# Patient Record
Sex: MALE | Employment: UNEMPLOYED | URBAN - NONMETROPOLITAN AREA
[De-identification: names, ages, dates, MRNs, and addresses within clinical notes are randomized per-mention and may not be internally consistent; named-entity substitution may affect disease eponyms.]

---

## 2018-09-07 PROBLEM — Z82.49 FH: CARDIOVASCULAR DISEASE: Status: ACTIVE | Noted: 2018-09-07

## 2018-09-07 PROBLEM — Z72.0 TOBACCO USE: Status: ACTIVE | Noted: 2018-09-07

## 2018-09-07 PROBLEM — H53.9 VISION CHANGES: Status: ACTIVE | Noted: 2018-09-07

## 2018-09-07 PROBLEM — D49.89: Status: ACTIVE | Noted: 2018-09-07

## 2018-09-07 PROBLEM — Z13.228 SCREENING FOR METABOLIC DISORDER: Status: ACTIVE | Noted: 2018-09-07

## 2018-09-07 PROBLEM — Z13.29 SCREENING FOR THYROID DISORDER: Status: ACTIVE | Noted: 2018-09-07

## 2018-09-07 PROBLEM — Z13.6 SCREENING FOR CARDIOVASCULAR CONDITION: Status: ACTIVE | Noted: 2018-09-07

## 2018-09-07 PROBLEM — R51.9 NONINTRACTABLE HEADACHE: Status: ACTIVE | Noted: 2018-09-07

## 2018-09-07 PROBLEM — J34.89 SINUS PRESSURE: Status: ACTIVE | Noted: 2018-09-07

## 2018-09-07 PROBLEM — Z13.0 ENCOUNTER FOR SCREENING FOR DISEASES OF THE BLOOD AND BLOOD-FORMING ORGANS AND CERTAIN DISORDERS INVOLVING THE IMMUNE MECHANISM: Status: ACTIVE | Noted: 2018-09-07

## 2022-06-02 ENCOUNTER — OFFICE VISIT (OUTPATIENT)
Dept: MEDICAL GROUP | Facility: CLINIC | Age: 37
End: 2022-06-02
Payer: COMMERCIAL

## 2022-06-02 VITALS
DIASTOLIC BLOOD PRESSURE: 72 MMHG | HEIGHT: 70 IN | SYSTOLIC BLOOD PRESSURE: 100 MMHG | RESPIRATION RATE: 18 BRPM | OXYGEN SATURATION: 94 % | HEART RATE: 81 BPM | BODY MASS INDEX: 23.59 KG/M2 | WEIGHT: 164.8 LBS | TEMPERATURE: 98.1 F

## 2022-06-02 DIAGNOSIS — Z00.00 ROUTINE PHYSICAL EXAMINATION: ICD-10-CM

## 2022-06-02 DIAGNOSIS — G89.29 CHRONIC RIGHT SHOULDER PAIN: ICD-10-CM

## 2022-06-02 DIAGNOSIS — R22.32 WRIST LUMP, LEFT: ICD-10-CM

## 2022-06-02 DIAGNOSIS — M25.511 CHRONIC RIGHT SHOULDER PAIN: ICD-10-CM

## 2022-06-02 PROBLEM — Z13.0 ENCOUNTER FOR SCREENING FOR DISEASES OF THE BLOOD AND BLOOD-FORMING ORGANS AND CERTAIN DISORDERS INVOLVING THE IMMUNE MECHANISM: Status: RESOLVED | Noted: 2018-09-07 | Resolved: 2022-06-02

## 2022-06-02 PROBLEM — J34.89 SINUS PRESSURE: Status: RESOLVED | Noted: 2018-09-07 | Resolved: 2022-06-02

## 2022-06-02 PROBLEM — H53.9 VISION CHANGES: Status: RESOLVED | Noted: 2018-09-07 | Resolved: 2022-06-02

## 2022-06-02 PROCEDURE — 99203 OFFICE O/P NEW LOW 30 MIN: CPT | Performed by: PHYSICIAN ASSISTANT

## 2022-06-02 RX ORDER — NAPROXEN 500 MG/1
TABLET ORAL
COMMUNITY
Start: 2022-03-24 | End: 2022-06-07

## 2022-06-02 RX ORDER — METHOCARBAMOL 750 MG/1
TABLET, FILM COATED ORAL
COMMUNITY
Start: 2022-03-24 | End: 2022-06-02

## 2022-06-02 ASSESSMENT — PATIENT HEALTH QUESTIONNAIRE - PHQ9
CLINICAL INTERPRETATION OF PHQ2 SCORE: 2
5. POOR APPETITE OR OVEREATING: 0 - NOT AT ALL
SUM OF ALL RESPONSES TO PHQ QUESTIONS 1-9: 9

## 2022-06-07 ENCOUNTER — OFFICE VISIT (OUTPATIENT)
Dept: MEDICAL GROUP | Facility: CLINIC | Age: 37
End: 2022-06-07
Payer: COMMERCIAL

## 2022-06-07 VITALS
BODY MASS INDEX: 23.22 KG/M2 | RESPIRATION RATE: 20 BRPM | WEIGHT: 171.4 LBS | DIASTOLIC BLOOD PRESSURE: 72 MMHG | OXYGEN SATURATION: 96 % | HEART RATE: 69 BPM | SYSTOLIC BLOOD PRESSURE: 114 MMHG | HEIGHT: 72 IN | TEMPERATURE: 98.9 F

## 2022-06-07 DIAGNOSIS — R22.32 MASS OF WRIST, LEFT: ICD-10-CM

## 2022-06-07 DIAGNOSIS — M25.511 CHRONIC RIGHT SHOULDER PAIN: ICD-10-CM

## 2022-06-07 DIAGNOSIS — G89.29 CHRONIC RIGHT SHOULDER PAIN: ICD-10-CM

## 2022-06-07 PROCEDURE — 99214 OFFICE O/P EST MOD 30 MIN: CPT | Performed by: PHYSICIAN ASSISTANT

## 2022-06-07 RX ORDER — MELOXICAM 7.5 MG/1
7.5 TABLET ORAL DAILY
Qty: 90 TABLET | Refills: 1 | Status: SHIPPED | OUTPATIENT
Start: 2022-06-07

## 2022-06-08 NOTE — PROGRESS NOTES
Chief Complaint   Patient presents with   • Results     X ray of wrist and shoulder review        HISTORY OF PRESENT ILLNESS: Patient is a 37 y.o. male established patient who presents today to discuss the following issues:    Mass of wrist, left  Patient has a large cyst on anterior aspect of lateral wrist for 4-6 months after hitting his wrist on a hard object. Mass is firm and non-mobile. Denies pain or tenderness but is concerned that it just popped up one day and he wants it removed.  He recently had x-ray of the wrist which showed no foreign body or soft tissue defect. Carpal bones intact with preserved spacing.  Patient states the lump is painful and bothersome.  Referral placed to hand surgery for further evaluation and treatment options.    Chronic right shoulder pain  Chronic condition for this patient.  Patient did physical therapy on this with moderate relief in the past.  States that started having increasing pain over the last month.  Patient states he is  his shoulder multiple times in the past.  Is asking for something stronger than ibuprofen to control the discomfort.  We will start him on meloxicam 7.5 mg daily.  He will follow-up in 3 months or sooner if necessary.      Patient Active Problem List    Diagnosis Date Noted   • Mass of wrist, left 06/07/2022   • Chronic right shoulder pain 06/02/2022   • Neoplasm of head 09/07/2018   • Nonintractable headache 09/07/2018   • Screening for cardiovascular condition 09/07/2018   • FH: cardiovascular disease 09/07/2018   • Tobacco use 09/07/2018       Allergies:Patient has no known allergies.    Current Outpatient Medications   Medication Sig Dispense Refill   • meloxicam (MOBIC) 7.5 MG Tab Take 1 Tablet by mouth every day. 90 Tablet 1   • ALBUTEROL INH Inhale.       No current facility-administered medications for this visit.       Social History     Tobacco Use   • Smoking status: Former Smoker     Packs/day: 1.00     Years: 8.00     Pack  years: 8.00     Types: Cigarettes     Quit date: 11/15/2021     Years since quittin.5   • Smokeless tobacco: Former User     Types: Chew   Vaping Use   • Vaping Use: Former   • Start date: 2022   • Substances: Nicotine, Flavoring, Nicotine 1.5 mg   • Devices: Refillable tank   Substance Use Topics   • Alcohol use: Yes     Comment: very rare   • Drug use: No       Family Status   Relation Name Status   • Mo  Alive   • Fa  Alive   • Sis  Alive   • Sis  Alive   • MGMo     • MGFa unknown Alive   • PGMo     • PGFa     • Neg Hx  (Not Specified)     Family History   Problem Relation Age of Onset   • Cancer Mother         skin   • Diabetes Mother         Prediabetes   • Hypertension Mother    • Migraines Mother    • Clotting Disorder Father    • Heart Attack Father    • No Known Problems Sister    • No Known Problems Sister    • Cancer Maternal Grandmother    • Colorectal Cancer Paternal Grandmother    • Cancer Paternal Grandfather         Lung   • Breast Cancer Neg Hx        Health Maintenance Summary          Overdue - COVID-19 Vaccine (1) Overdue - never done    No completion history exists for this topic.          Overdue - IMM DTaP/Tdap/Td Vaccine (1 - Tdap) Overdue - never done    No completion history exists for this topic.          IMM INFLUENZA (Season Ended) Next due on 2022    No completion history exists for this topic.          IMM HEP B VACCINE (Series Information) Aged Out    No completion history exists for this topic.          IMM MENINGOCOCCAL VACCINE (MCV4) (Series Information) Aged Out    No completion history exists for this topic.          IMM PNEUMOCOCCAL VACCINE: 0-64 Years (Series Information) Aged Out    No completion history exists for this topic.                 Review of Systems:   Constitutional: Negative for fever, chills, weight loss and malaise/fatigue.   HENT: Negative for ear pain, nosebleeds, congestion, sore throat and neck pain.    Eyes: Negative for  "blurred vision.   Respiratory: Negative for cough, sputum production, shortness of breath and wheezing.    Cardiovascular: Negative for chest pain, palpitations, orthopnea and leg swelling.   Gastrointestinal: Negative for heartburn, nausea, vomiting and abdominal pain.   Genitourinary: Negative for dysuria, urgency and frequency.   Musculoskeletal: Negative for myalgias, back pain and joint pain.   Skin: Negative for rash and itching.   Neurological: Negative for dizziness, tingling, tremors, sensory change, focal weakness and headaches.   Endo/Heme/Allergies: Does not bruise/bleed easily.   Psychiatric/Behavioral: Negative for depression, suicidal ideas and memory loss.  The patient is not nervous/anxious and does not have insomnia.    All other systems reviewed and are negative except as in HPI.    Wt Readings from Last 3 Encounters:   06/07/22 77.7 kg (171 lb 6.4 oz)   06/02/22 74.8 kg (164 lb 12.8 oz)   09/07/18 75.8 kg (167 lb)   ]    Exam:  /72 (BP Location: Left arm, Patient Position: Sitting, BP Cuff Size: Adult)   Pulse 69   Temp 37.2 °C (98.9 °F) (Temporal)   Resp 20   Ht 1.816 m (5' 11.5\")   Wt 77.7 kg (171 lb 6.4 oz)   SpO2 96%  Body mass index is 23.57 kg/m².   General:  Well nourished, well developed male. No apparent distress.  Eyes: EOM intact, PERRL, conjunctiva non-injected, sclera non-icteric.  Neck: Supple with no cervical lymphadenopathy, JVD, palpable thyroid nodules or carotid bruits.  Pulmonary: Clear to ausculation bilaterally. Normal effort. No rales, ronchi, or wheezing.  Cardiovascular: Regular rate and rhythm without murmur, rub or gallop.   Extremities: Painful, firm nodule on left wrist after previous trauma.  All other extremities have full range of motion. Warm and well perfused with no edema.  Skin: Intact with no obvious rashes or lesions.  Neuro: Cranial nerves I-XII intact.  Psych: Alert and oriented x 3.  Appropriately dressed. Mood and affect " appropriate.      Assessment/Plan:  1. Chronic right shoulder pain  meloxicam (MOBIC) 7.5 MG Tab   2. Mass of wrist, left  Referral to Hand Surgery       Reviewed risks and benefits of treatment plan. Patient verbally agrees to plan of care.     Return in about 3 months (around 9/7/2022) for f/u shoulder and wrist.    Please note that this dictation was created using voice recognition software. I have made every reasonable attempt to correct obvious errors, but I expect that there are errors of grammar and possibly content that I did not discover before finalizing the note.

## 2022-06-20 NOTE — ASSESSMENT & PLAN NOTE
Patient has a large cyst on anterior aspect of lateral wrist for 4-6 months after hitting his wrist on a hard object. Mass is firm and non-mobile. Denies pain or tenderness but is concerned that it just popped up one day and he wants it removed.  He recently had x-ray of the wrist which showed no foreign body or soft tissue defect. Carpal bones intact with preserved spacing.  Patient states the lump is painful and bothersome.  Referral placed to hand surgery for further evaluation and treatment options.

## 2022-06-20 NOTE — ASSESSMENT & PLAN NOTE
Chronic condition for this patient.  Patient did physical therapy on this with moderate relief in the past.  States that started having increasing pain over the last month.  Patient states he is  his shoulder multiple times in the past.  Is asking for something stronger than ibuprofen to control the discomfort.  We will start him on meloxicam 7.5 mg daily.  He will follow-up in 3 months or sooner if necessary.

## 2022-06-29 PROBLEM — Z00.00 ROUTINE PHYSICAL EXAMINATION: Status: ACTIVE | Noted: 2022-06-29

## 2022-06-29 NOTE — ASSESSMENT & PLAN NOTE
Chronic condition. States he did physical therapy and that helped for awhile. Pain has returned over the last month. Has history of multiple shoulder separations. Wants to know if he can take anything stronger than ibuprofen. Was on robaxin and naproxen for a back injury recently and states that these did not help. Ibuprofen is helping some with the discomfort. Will get xray and follow up in 5 days to review results. Will discuss further when we know what we are dealing with.

## 2022-06-29 NOTE — ASSESSMENT & PLAN NOTE
Patient state that he hit his wrist against something about 6 months ago. States that a lump immediately appeared where he injured it. Said it hurt at the time but not currently. Physical exam shows firm, non-mobile mass. Will order xray and follow up to review results.

## 2022-06-29 NOTE — ASSESSMENT & PLAN NOTE
Patient here today to establish care. Not currently due for routine fasting labs.   States no previous PCP. No medical records requested.

## 2022-06-29 NOTE — PROGRESS NOTES
Chief Complaint   Patient presents with   • Establish Care     New pt est care   • Cyst     Pt c/o cyst on L forearm. X 2 months         HPI:  Be is a 37 y.o. male new patient presenting to establish care and discuss the following:    Routine physical examination  Patient here today to establish care. Not currently due for routine fasting labs.   States no previous PCP. No medical records requested.    Wrist lump, left  Patient state that he hit his wrist against something about 6 months ago. States that a lump immediately appeared where he injured it. Said it hurt at the time but not currently. Physical exam shows firm, non-mobile mass. Will order xray and follow up to review results.    Chronic right shoulder pain  Chronic condition. States he did physical therapy and that helped for awhile. Pain has returned over the last month. Has history of multiple shoulder separations. Wants to know if he can take anything stronger than ibuprofen. Was on robaxin and naproxen for a back injury recently and states that these did not help. Ibuprofen is helping some with the discomfort. Will get xray and follow up in 5 days to review results. Will discuss further when we know what we are dealing with.      Patient Active Problem List    Diagnosis Date Noted   • Routine physical examination 06/29/2022   • Wrist lump, left 06/07/2022   • Chronic right shoulder pain 06/02/2022   • Neoplasm of head 09/07/2018   • Nonintractable headache 09/07/2018   • Screening for cardiovascular condition 09/07/2018   • FH: cardiovascular disease 09/07/2018   • Tobacco use 09/07/2018       Current Outpatient Medications   Medication Sig Dispense Refill   • ALBUTEROL INH Inhale.     • meloxicam (MOBIC) 7.5 MG Tab Take 1 Tablet by mouth every day. 90 Tablet 1     No current facility-administered medications for this visit.       Allergies as of 06/02/2022   • (No Known Allergies)        Social History     Socioeconomic History   • Marital status:       Spouse name: Melanie   • Number of children: 2   • Years of education: Not on file   • Highest education level: Some college, no degree   Occupational History   • Occupation: Unemployed   Tobacco Use   • Smoking status: Former Smoker     Packs/day: 1.00     Years: 8.00     Pack years: 8.00     Types: Cigarettes     Quit date: 11/15/2021     Years since quittin.6   • Smokeless tobacco: Former User     Types: Chew   Vaping Use   • Vaping Use: Former   • Start date: 2022   • Substances: Nicotine, Flavoring, Nicotine 1.5 mg   • Devices: Refillable tank   Substance and Sexual Activity   • Alcohol use: Yes     Comment: very rare   • Drug use: No   • Sexual activity: Yes     Partners: Female   Other Topics Concern   • Not on file   Social History Narrative   • Not on file     Social Determinants of Health     Financial Resource Strain: Not on file   Food Insecurity: Not on file   Transportation Needs: Not on file   Physical Activity: Not on file   Stress: Not on file   Social Connections: Not on file   Intimate Partner Violence: Not on file   Housing Stability: Not on file       Family History   Problem Relation Age of Onset   • Cancer Mother         skin   • Diabetes Mother         Prediabetes   • Hypertension Mother    • Migraines Mother    • Clotting Disorder Father    • Heart Attack Father    • No Known Problems Sister    • No Known Problems Sister    • Cancer Maternal Grandmother    • Colorectal Cancer Paternal Grandmother    • Cancer Paternal Grandfather         Lung   • Breast Cancer Neg Hx        Past Surgical History:   Procedure Laterality Date   • OPEN REDUCTION Right 1997    elbow fracture       Review of Systems:   Constitutional: Negative for fever, chills, weight loss and malaise/fatigue.   HENT: Negative for ear pain, nosebleeds, congestion, sore throat and neck pain.    Eyes: Negative for blurred vision.   Respiratory: Negative for cough, sputum production, shortness of breath and  "wheezing.    Cardiovascular: Negative for chest pain, palpitations, orthopnea and leg swelling.   Gastrointestinal: Negative for heartburn, nausea, vomiting and abdominal pain.   Genitourinary: Negative for dysuria, urgency and frequency.   Musculoskeletal: Positive for right shoulder pain and painless lump on left wrist. Negative for myalgias, back pain and other joint pain.   Skin: Negative for rash and itching.   Neurological: Negative for dizziness, tingling, tremors, sensory change, focal weakness and headaches.   Endo/Heme/Allergies: Does not bruise/bleed easily.   Psychiatric/Behavioral: Negative for depression, suicidal ideas and memory loss.  The patient is not nervous/anxious and does not have insomnia.    All other systems reviewed and are negative except as in HPI.    Wt Readings from Last 3 Encounters:   06/07/22 77.7 kg (171 lb 6.4 oz)   06/02/22 74.8 kg (164 lb 12.8 oz)   09/07/18 75.8 kg (167 lb)   ]    Exam:  /72 (BP Location: Left arm, Patient Position: Sitting, BP Cuff Size: Adult)   Pulse 81   Temp 36.7 °C (98.1 °F) (Temporal)   Resp 18   Ht 1.778 m (5' 10\")   Wt 74.8 kg (164 lb 12.8 oz)   SpO2 94%  Body mass index is 23.65 kg/m².   General:  Well nourished, well developed male. No apparent distress. Not ill appearing.  Eyes: EOM intact, PERRL, conjunctiva non-injected, sclera non-icteric.  Neck: Supple with no cervical lymphadenopathy, JVD, palpable thyroid nodules or carotid bruits.  Pulmonary: Clear to ausculation bilaterally. Normal effort. No rales, ronchi, or wheezing.  Cardiovascular: Regular rate and rhythm without murmur, rub or gallop.   Extremities: Full range of motion, slightly decreased on right shoulder. Warm and well perfused with no edema.  Skin: Intact with no obvious rashes or lesions.  Neuro: Cranial nerves I-XII grossly intact.  Psych: Alert and oriented x 3.  Appropriately dressed. Mood and affect appropriate.    Assessment/Plan:  1. Wrist lump, left  " DX-WRIST-COMPLETE 3+ LEFT   2. Chronic right shoulder pain  DX-SHOULDER 2+ RIGHT   3. Routine physical examination         Reviewed risks and benefits of treatment plan. Patient verbally agrees to plan of care.     Return in about 5 days (around 6/7/2022) for f/u x-rays.    Please note that this dictation was created using voice recognition software. I have made every reasonable attempt to correct obvious errors, but I expect that there are errors of grammar and possibly content that I did not discover before finalizing the note.

## 2022-09-12 ENCOUNTER — TELEPHONE (OUTPATIENT)
Dept: MEDICAL GROUP | Facility: CLINIC | Age: 37
End: 2022-09-12
Payer: MEDICAID

## 2023-10-23 ENCOUNTER — TELEPHONE (OUTPATIENT)
Dept: HEALTH INFORMATION MANAGEMENT | Facility: OTHER | Age: 38
End: 2023-10-23

## 2024-02-26 ENCOUNTER — TELEPHONE (OUTPATIENT)
Dept: HEALTH INFORMATION MANAGEMENT | Facility: OTHER | Age: 39
End: 2024-02-26
Payer: MEDICAID